# Patient Record
Sex: MALE | Race: WHITE | NOT HISPANIC OR LATINO | URBAN - METROPOLITAN AREA
[De-identification: names, ages, dates, MRNs, and addresses within clinical notes are randomized per-mention and may not be internally consistent; named-entity substitution may affect disease eponyms.]

---
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
## 2022-04-08 NOTE — ED PROVIDER NOTE - CLINICAL SUMMARY MEDICAL DECISION MAKING FREE TEXT BOX
ED 21 y/o with right ankle injury.  Splint removed, +swelling to right ankle, NVI.  Ortho aware, will be down to resplint and evaluate. 19 y/o with right ankle injury.  Splint removed, +swelling to right ankle, NVI.  Ortho aware, will be down to resplint and evaluate.    Attending: Agree with above. Ankle injury. NVI now. Will obtain xray and give pain control. Consult ortho. RAKESH Espino MD PEM Attending

## 2025-03-30 ENCOUNTER — NON-APPOINTMENT (OUTPATIENT)
Age: 25
End: 2025-03-30